# Patient Record
Sex: MALE | Race: WHITE | NOT HISPANIC OR LATINO | Employment: FULL TIME | URBAN - METROPOLITAN AREA
[De-identification: names, ages, dates, MRNs, and addresses within clinical notes are randomized per-mention and may not be internally consistent; named-entity substitution may affect disease eponyms.]

---

## 2024-11-28 ENCOUNTER — HOSPITAL ENCOUNTER (EMERGENCY)
Facility: HOSPITAL | Age: 49
Discharge: HOME | End: 2024-11-28
Attending: STUDENT IN AN ORGANIZED HEALTH CARE EDUCATION/TRAINING PROGRAM
Payer: COMMERCIAL

## 2024-11-28 VITALS
HEART RATE: 104 BPM | TEMPERATURE: 98.4 F | SYSTOLIC BLOOD PRESSURE: 141 MMHG | BODY MASS INDEX: 41.92 KG/M2 | WEIGHT: 283 LBS | OXYGEN SATURATION: 99 % | RESPIRATION RATE: 16 BRPM | DIASTOLIC BLOOD PRESSURE: 89 MMHG | HEIGHT: 69 IN

## 2024-11-28 DIAGNOSIS — S61.412A LACERATION OF LEFT HAND WITHOUT FOREIGN BODY, INITIAL ENCOUNTER: Primary | ICD-10-CM

## 2024-11-28 PROCEDURE — 12001 RPR S/N/AX/GEN/TRNK 2.5CM/<: CPT

## 2024-11-28 PROCEDURE — 99283 EMERGENCY DEPT VISIT LOW MDM: CPT | Mod: 25

## 2024-11-28 PROCEDURE — 99284 EMERGENCY DEPT VISIT MOD MDM: CPT | Performed by: STUDENT IN AN ORGANIZED HEALTH CARE EDUCATION/TRAINING PROGRAM

## 2024-11-28 PROCEDURE — 12001 RPR S/N/AX/GEN/TRNK 2.5CM/<: CPT | Performed by: STUDENT IN AN ORGANIZED HEALTH CARE EDUCATION/TRAINING PROGRAM

## 2024-11-28 ASSESSMENT — COLUMBIA-SUICIDE SEVERITY RATING SCALE - C-SSRS
1. IN THE PAST MONTH, HAVE YOU WISHED YOU WERE DEAD OR WISHED YOU COULD GO TO SLEEP AND NOT WAKE UP?: NO
2. HAVE YOU ACTUALLY HAD ANY THOUGHTS OF KILLING YOURSELF?: NO
6. HAVE YOU EVER DONE ANYTHING, STARTED TO DO ANYTHING, OR PREPARED TO DO ANYTHING TO END YOUR LIFE?: NO

## 2024-11-28 ASSESSMENT — LIFESTYLE VARIABLES
TOTAL SCORE: 0
HAVE PEOPLE ANNOYED YOU BY CRITICIZING YOUR DRINKING: NO
EVER HAD A DRINK FIRST THING IN THE MORNING TO STEADY YOUR NERVES TO GET RID OF A HANGOVER: NO
EVER FELT BAD OR GUILTY ABOUT YOUR DRINKING: NO
HAVE YOU EVER FELT YOU SHOULD CUT DOWN ON YOUR DRINKING: NO

## 2024-11-28 ASSESSMENT — PAIN SCALES - GENERAL: PAINLEVEL_OUTOF10: 4

## 2024-11-28 ASSESSMENT — PAIN - FUNCTIONAL ASSESSMENT: PAIN_FUNCTIONAL_ASSESSMENT: 0-10

## 2024-11-28 ASSESSMENT — PAIN DESCRIPTION - LOCATION: LOCATION: HAND

## 2024-11-28 NOTE — DISCHARGE INSTRUCTIONS
Your wound was repaired with Dermabond. Dermabond is a form of superglue which is used to both seal the wound and approximate the wounds edges for better healing. You should not put antibiotic ointment on the Dermabond as it will dissolve the glue prematurely. As the wound heals underneath the Dermabond will begin to dry and flake off. This process can take over a week. You may gently clean the wound with soap and water after 24 hours but do not scrub or scratch the Dermabond. If the Dermabond fails before 5-7 days or the wound re-opens have the wound evaluated by a physician. Keep the wound clean and dry, you may cover with a dry bandage. Watch for signs of infection (redness, warmth, swelling, foul smelling or purulent discharge) and seek care immediately if they develop.

## 2024-11-28 NOTE — ED PROVIDER NOTES
EMERGENCY DEPARTMENT ENCOUNTER      Pt Name: Reymundo Vang  MRN: [id number]  Birthdate 1975  Date of evaluation: 11/28/2024  Provider: Abdulaziz Rich DO    CHIEF COMPLAINT       Chief Complaint   Patient presents with    Hand Injury     Laceration to the left hand, cut hand with a knife. Pt states that it was a new knife    Laceration         HISTORY OF PRESENT ILLNESS    49-year-old male comes to the emergency department with an injury to the left hand, lacerated his hand with a brand-new knife, happened just prior to coming, says his tetanus has been updated within the last 5 years, is not on any anticoagulation, has no other symptoms today.  States that he was working in the kitchen at the time of the incident.  Denies any other recent illness.  Still able to flex and extend the digit.  Denies any decrease sensation.      History provided by:  Patient      Nursing Notes were reviewed.    PAST MEDICAL HISTORY     Past Medical History:   Diagnosis Date    Epilepsy, unspecified, not intractable, without status epilepticus (Multi)     Epilepsy    Personal history of other endocrine, nutritional and metabolic disease 06/18/2014    History of insulin dependent diabetes mellitus    Personal history of urinary calculi     Personal history of renal calculi         SURGICAL HISTORY       Past Surgical History:   Procedure Laterality Date    BACK SURGERY  06/18/2014    Back Surgery    GASTRIC BYPASS  06/18/2014    Gastric Surgery For Morbid Obesity Gastric Bypass         CURRENT MEDICATIONS       Previous Medications    No medications on file       ALLERGIES     Morphine and Sulfa (sulfonamide antibiotics)    FAMILY HISTORY     No family history on file.       SOCIAL HISTORY       Social History     Socioeconomic History    Marital status:      Social Drivers of Health     Financial Resource Strain: Low Risk  (12/8/2022)    Received from [facility]    Overall Financial Resource Strain (CARDIA)      Difficulty of Paying Living Expenses: Not very hard   Food Insecurity: Food Insecurity Present (12/8/2022)    Received from [facility]    Hunger Vital Sign     Worried About Running Out of Food in the Last Year: Sometimes true     Ran Out of Food in the Last Year: Sometimes true   Transportation Needs: No Transportation Needs (12/8/2022)    Received from [facility]    PRAPARE - Transportation     Lack of Transportation (Medical): No     Lack of Transportation (Non-Medical): No   Physical Activity: Insufficiently Active (12/8/2022)    Received from [facility]    Exercise Vital Sign     Days of Exercise per Week: 3 days     Minutes of Exercise per Session: 30 min   Stress: Stress Concern Present (12/8/2022)    Received from [facility]    [employer] - Occupational Stress Questionnaire     Feeling of Stress : Rather much   Social Connections: Socially Isolated (12/8/2022)    Received from [facility]    Social Connection and Isolation Panel [NHANES]     Frequency of Communication with Friends and Family: Never     Frequency of Social Gatherings with Friends and Family: Once a week     Attends Rastafarian Services: Never     Active Member of Clubs or Organizations: No     Attends Club or Organization Meetings: Never     Marital Status:    Housing Stability: High Risk (12/8/2022)    Received from [facility]    Housing Stability Vital Sign     Unable to Pay for Housing in the Last Year: Yes     Number of Places Lived in the Last Year: 1     Unstable Housing in the Last Year: No       SCREENINGS                        PHYSICAL EXAM    (up to 7 for level 4, 8 or more for level 5)     ED Triage Vitals [11/28/24 1309]   Temperature Heart Rate Respirations BP   36.9 °C (98.4 °F) (!) 104 16 141/89      Pulse Ox Temp Source Heart Rate Source Patient Position   99 % Temporal -- Sitting      BP Location FiO2 (%)     Right arm --       Physical Exam  Vitals and  nursing note reviewed.   Constitutional:       Appearance: Normal appearance.   HENT:      Head: Normocephalic and atraumatic.   Eyes:      General: No scleral icterus.        Right eye: No discharge.         Left eye: No discharge.      Conjunctiva/sclera: Conjunctivae normal.   Cardiovascular:      Rate and Rhythm: Normal rate and regular rhythm.   Pulmonary:      Effort: Pulmonary effort is normal. No respiratory distress.   Abdominal:      General: There is no distension.   Musculoskeletal:      Cervical back: Normal range of motion.      Comments: Patient is full range of motion of all 5 fingers on his left hand, opposition of the thumb intact, median, radial and ulnar nerve sensation intact.   Skin:     General: Skin is warm and dry.      Comments: There is a 1 cm laceration on the patient's left hypothenar eminence, it is not gaping, no obvious foreign bodies, bleeding is well-controlled.   Neurological:      Mental Status: He is alert. Mental status is at baseline.   Psychiatric:         Mood and Affect: Mood normal.         Behavior: Behavior normal.          DIAGNOSTIC RESULTS     LABS:  Labs Reviewed - No data to display    All other labs were within normal range or not returned as of this dictation.    Imaging  No orders to display        Procedures  Laceration Repair    Performed by: Abdulaziz Rich DO  Authorized by: Margarito Canales DO    Consent:     Consent obtained:  Verbal    Consent given by:  Patient    Risks, benefits, and alternatives were discussed: yes      Risks discussed:  Infection, need for additional repair, nerve damage, poor wound healing, poor cosmetic result, pain, retained foreign body, tendon damage and vascular damage    Alternatives discussed:  No treatment, delayed treatment and observation  Universal protocol:     Procedure explained and questions answered to patient or proxy's satisfaction: yes      Relevant documents present and verified: yes      Required blood products,  implants, devices, and special equipment available: yes      Site/side marked: yes      Immediately prior to procedure, a time out was called: yes      Patient identity confirmed:  Verbally with patient and arm band  Anesthesia:     Anesthesia method:  None  Laceration details:     Location:  Hand    Hand location:  L palm    Length (cm):  1    Depth (mm):  4  Exploration:     Limited defect created (wound extended): no      Hemostasis achieved with:  Direct pressure    Wound exploration: entire depth of wound visualized      Wound extent: no fascia violation noted, no foreign bodies/material noted, no muscle damage noted, no nerve damage noted, no tendon damage noted, no underlying fracture noted and no vascular damage noted      Contaminated: no    Treatment:     Area cleansed with:  Saline    Amount of cleaning:  Standard    Irrigation solution:  Tap water    Irrigation volume:  5 liters    Irrigation method:  Tap    Visualized foreign bodies/material removed: no      Debridement:  None    Undermining:  None    Scar revision: no    Skin repair:     Repair method:  Tissue adhesive  Approximation:     Approximation:  Close  Repair type:     Repair type:  Simple  Post-procedure details:     Dressing:  Open (no dressing)    Procedure completion:  Tolerated       EMERGENCY DEPARTMENT COURSE/MDM:     ED Course as of 11/28/24 1330   Thu Nov 28, 2024   1323 She decision-making with the patient at this time.  He does have a very mild hypothenar eminence laceration.  Offered laceration pair in the form of sutures however he prefers wound glue at this time.  There is no active bleeding.  There is no sign of ligamentous rupture.  There is no surrounding erythema or fluctuance to suggest acute infection.  Skin glue to be applied.  Patient copiously irrigating hand at bedside under the ED sink. [TL]      ED Course User Index  [TL] Margarito Canales DO         Diagnoses as of 11/28/24 1330   Laceration of left hand without  foreign body, initial encounter        Medical Decision Making  49-year-old male comes emergency with laceration of his left hypothenar eminence, it is already well-approximated, not gaping, and a spot that is not likely to reopen.  Had shared decision-making with the patient, ended up deciding to use Dermabond glue to close the laceration after the bleeding was controlled.  Patient was discharged with Dermabond care instructions, watch for signs of infection, follow-up with his PCP and return for any new, concerning worsening symptoms.  He was amenable to this plan.        Patient and or family in agreement and understanding of treatment plan.  All questions answered.      I reviewed the case with the attending ED physician. The attending ED physician agrees with the plan. Patient and/or patient´s representative was counseled regarding labs, imaging, likely diagnosis, and plan. All questions were answered.    ED Medications administered this visit:  Medications - No data to display    New Prescriptions from this visit:    New Prescriptions    No medications on file       Follow-up:  Julia Holloway MD  [id number] [facility], [facility] [address]  [facility] [id number] [phone]    Schedule an appointment as soon as possible for a visit           Final Impression:   1. Laceration of left hand without foreign body, initial encounter          (Please note that portions of this note were completed with a voice recognition program.  Efforts were made to edit the dictations but occasionally words are mis-transcribed.)     Abdulaziz Rich DO  Resident  11/28/24 2986    I performed a history and physical examination of the patient and discussed his management with the resident physician.  I agree with the history, physical, assessment, and plan of care, with the following exceptions:   Left hand Exam: Patient exhibits ability to flex and extend all digits. Including flexion at the proximal and distal  interphalangeal joints against resistance.  Median nerve was tested with thumb abduction against resistance and opposition which were normal.  Sensory testing was performed of the median nerve using light touch on the radial and ulnar aspects of digits 1 through 3.  Radial nerve testing was performed with thumb extension against resistance which was normal.  Sensory testing of the radial nerve was normal via light touch to the central and radial aspect of the dorsum of the hand and dorsal radial aspect of the thumb.  Ulnar nerve was tested via abduction of fingers against resistance as well as light touch to the fourth and fifth digits.  Cap refill was less than 2 seconds in all 5 digits.  Alignment check was performed which revealed no malrotation.  No sign of acute compartment syndrome, flexor tenosynovitis, high-pressure injection injury.  No flexor tendon injury.      I was present for key and critical portions of the following procedures: Laceration repair  Time Spent in Critical Care of the patient: None  Time spent in discussions with the patient and family: 30    DO Margarito Landis DO  11/29/24 8724